# Patient Record
(demographics unavailable — no encounter records)

---

## 2024-12-20 NOTE — PHYSICAL EXAM
[No Acute Distress] : no acute distress [Obese] : obese [Normal S1, S2] : normal S1, S2 [Clear Lung Fields] : clear lung fields [Normal Gait] : normal gait [No Edema] : no edema [Alert and Oriented] : alert and oriented [de-identified] : Irregular

## 2024-12-20 NOTE — HISTORY OF PRESENT ILLNESS
[FreeTextEntry1] : Anthony Godoy is a 62-year-old man with a history of obesity, permanent atrial fibrillation (anticoagulated with warfarin-his preference), cardiomyopathy (resolved), valvular heart disease (aortic and mitral regurgitation), and suboptimal adherence with treatment plan who returns for cardiac examination --I last saw him in November 2023. During our last encounter his blood pressure was elevated and I asked him to return to see me 2 weeks later--he did not return until today.  He feels well; retiring in 11 days and moving to [facility].  He says he took his medications today but overall adherence is suboptimal.  He has a poor diet and has not been successful with weight loss.  He denies chest pain, palpitations, difficulty breathing.

## 2024-12-20 NOTE — DISCUSSION/SUMMARY
[With Me] : with me [___ Week(s)] : in [unfilled] week(s) [FreeTextEntry1] :  Hypertension: Severe and uncontrolled; increase carvedilol to 25 mg twice daily and continue lisinopril; check labs.  Atrial fibrillation: Permanent; He has chosen to take warfarin after remote use of a DOAC -- Adherence with warfarin use and INR monitoring also suboptimal.  History of cardiomyopathy: I recommend echocardiography to assess LV function and valve disease - Will be imaged at time of next visit in approximately 2 weeks.  * I told him that his adherence with medical care predisposes him to significant complications of disease and strongly urged more diligent use of medications and the need for more regular follow-up and lifestyle modification.

## 2024-12-20 NOTE — CARDIOLOGY SUMMARY
[de-identified] : 12/20/2024.  Atrial fibrillation.  Right bundle branch block with left axis -bifascicular block. Voltage criteria for LVH.  [de-identified] : 5/13/17, Technically difficult study. Mild LVH with normal systolic function. Mild aortic insufficiency. Trivial mitral regurgitation. LVEF 62%. [de-identified] : 11/17/2020, Normal myocardial perfusion SPECT scan. LVEF 55%

## 2025-01-08 NOTE — REVIEW OF SYSTEMS
[Negative] : Heme/Lymph [Headache] : no headache [SOB] : no shortness of breath [Chest Discomfort] : no chest discomfort

## 2025-01-08 NOTE — PHYSICAL EXAM
[No Acute Distress] : no acute distress [Obese] : obese [Normal S1, S2] : normal S1, S2 [Clear Lung Fields] : clear lung fields [Normal Gait] : normal gait [No Edema] : no edema [Alert and Oriented] : alert and oriented [de-identified] : Irregular, Normal rate

## 2025-01-08 NOTE — HISTORY OF PRESENT ILLNESS
[FreeTextEntry1] : Anthony Godoy is a 62-year-old man with a history of obesity, permanent atrial fibrillation (anticoagulated with warfarin--his preference), cardiomyopathy (resolved), valvular heart disease (aortic and mitral regurgitation), and suboptimal adherence with treatment plan who returns for cardiac examination.  During our last encounter in mid-December, his blood pressure was severely elevated, and I increased dose of carvedilol from 12.5 mg to 25 mg twice daily.  He called me the following day and said that he erroneously stopped taking carvedilol "a long time ago."  I then prescribed carvedilol 6.25 mg twice daily and recommended short interval follow-up.  He has been tolerating carvedilol; admits to poor diet but says there is "a chance" that he will improve his eating style; does not seem interested in pursuing exercise.  * This visit was conducted as part of ongoing, longitudinal medical care for patient's chronic medical diagnoses and other issues.

## 2025-01-08 NOTE — CARDIOLOGY SUMMARY
[de-identified] : 12/20/2024.  Atrial fibrillation.  Right bundle branch block with left axis -bifascicular block. Voltage criteria for LVH.  [de-identified] : 11/17/2020, Normal myocardial perfusion SPECT scan. LVEF 55%   [de-identified] : 5/13/17, Technically difficult study. Mild LVH with normal systolic function. Mild aortic insufficiency. Trivial mitral regurgitation. LVEF 62%.

## 2025-01-08 NOTE — DISCUSSION/SUMMARY
[With Me] : with me [___ Month(s)] : in [unfilled] month(s) [FreeTextEntry1] :  Hypertension: Modest improvement; continue lisinopril and diltiazem; increase carvedilol to 12.5 mg twice daily.  I asked him return to see me in 1 month but [place]--he plans to return within the next 3 months to see me; I urged him to seek medical care from a primary care physician closer to his new home in [place]. We discussed the risks of uncontrolled hypertension and need for optimization of blood pressure.  Atrial fibrillation: Stable/controlled; continue warfarin--his choice after poor experience with Pradaxa many years ago.  History of cardiomyopathy: LV function has improved; we discussed the need for achieving blood pressure goal.  Thrombocytopenia: Chronic, mild; no bleeding and tolerating anticoagulation; he is not interested in pursuing hematology evaluation.